# Patient Record
Sex: FEMALE | Race: WHITE | NOT HISPANIC OR LATINO
[De-identification: names, ages, dates, MRNs, and addresses within clinical notes are randomized per-mention and may not be internally consistent; named-entity substitution may affect disease eponyms.]

---

## 2017-04-27 ENCOUNTER — RECORDS - HEALTHEAST (OUTPATIENT)
Dept: ADMINISTRATIVE | Facility: OTHER | Age: 57
End: 2017-04-27

## 2018-02-13 ENCOUNTER — OFFICE VISIT - HEALTHEAST (OUTPATIENT)
Dept: FAMILY MEDICINE | Facility: CLINIC | Age: 58
End: 2018-02-13

## 2018-02-13 DIAGNOSIS — J45.901 ASTHMA EXACERBATION: ICD-10-CM

## 2018-02-13 ASSESSMENT — MIFFLIN-ST. JEOR: SCORE: 1075.25

## 2018-12-13 ENCOUNTER — AMBULATORY - HEALTHEAST (OUTPATIENT)
Dept: MULTI SPECIALTY CLINIC | Facility: CLINIC | Age: 58
End: 2018-12-13

## 2018-12-13 LAB
HPV_EXT - HISTORICAL: NORMAL
PAP SMEAR - HIM PATIENT REPORTED: NORMAL

## 2019-01-18 ENCOUNTER — RECORDS - HEALTHEAST (OUTPATIENT)
Dept: ADMINISTRATIVE | Facility: OTHER | Age: 59
End: 2019-01-18

## 2020-01-10 ENCOUNTER — OFFICE VISIT - HEALTHEAST (OUTPATIENT)
Dept: FAMILY MEDICINE | Facility: CLINIC | Age: 60
End: 2020-01-10

## 2020-01-10 DIAGNOSIS — J45.901 ASTHMA EXACERBATION: ICD-10-CM

## 2020-01-10 DIAGNOSIS — Z12.11 SCREEN FOR COLON CANCER: ICD-10-CM

## 2020-01-10 DIAGNOSIS — Z13.220 LIPID SCREENING: ICD-10-CM

## 2020-01-10 DIAGNOSIS — R73.02 IGT (IMPAIRED GLUCOSE TOLERANCE): ICD-10-CM

## 2020-01-10 LAB
CHOLEST SERPL-MCNC: 213 MG/DL
FASTING STATUS PATIENT QL REPORTED: YES
HBA1C MFR BLD: 6.1 % (ref 3.5–6)
HDLC SERPL-MCNC: 53 MG/DL
LDLC SERPL CALC-MCNC: 140 MG/DL
TRIGL SERPL-MCNC: 100 MG/DL

## 2020-01-10 RX ORDER — ALBUTEROL SULFATE 0.83 MG/ML
2.5 SOLUTION RESPIRATORY (INHALATION) 3 TIMES DAILY PRN
Qty: 60 ML | Refills: 1 | Status: SHIPPED | OUTPATIENT
Start: 2020-01-10

## 2020-01-10 ASSESSMENT — MIFFLIN-ST. JEOR: SCORE: 1040.49

## 2020-08-25 ENCOUNTER — OFFICE VISIT - HEALTHEAST (OUTPATIENT)
Dept: FAMILY MEDICINE | Facility: CLINIC | Age: 60
End: 2020-08-25

## 2020-08-25 DIAGNOSIS — R52 BODY ACHES: ICD-10-CM

## 2020-08-25 DIAGNOSIS — R53.83 FATIGUE, UNSPECIFIED TYPE: ICD-10-CM

## 2020-08-25 DIAGNOSIS — R73.02 IGT (IMPAIRED GLUCOSE TOLERANCE): ICD-10-CM

## 2020-08-25 LAB
ERYTHROCYTE [DISTWIDTH] IN BLOOD BY AUTOMATED COUNT: 12.1 % (ref 11–14.5)
ERYTHROCYTE [SEDIMENTATION RATE] IN BLOOD BY WESTERGREN METHOD: 7 MM/HR (ref 0–20)
HBA1C MFR BLD: 5.7 %
HCT VFR BLD AUTO: 39.8 % (ref 35–47)
HGB BLD-MCNC: 13 G/DL (ref 12–16)
MCH RBC QN AUTO: 27.9 PG (ref 27–34)
MCHC RBC AUTO-ENTMCNC: 32.8 G/DL (ref 32–36)
MCV RBC AUTO: 85 FL (ref 80–100)
PLATELET # BLD AUTO: 242 THOU/UL (ref 140–440)
PMV BLD AUTO: 8.1 FL (ref 7–10)
RBC # BLD AUTO: 4.67 MILL/UL (ref 3.8–5.4)
WBC: 4.7 THOU/UL (ref 4–11)

## 2020-08-26 LAB
ALBUMIN SERPL-MCNC: 4 G/DL (ref 3.5–5)
ALP SERPL-CCNC: 62 U/L (ref 45–120)
ALT SERPL W P-5'-P-CCNC: 15 U/L (ref 0–45)
ANION GAP SERPL CALCULATED.3IONS-SCNC: 10 MMOL/L (ref 5–18)
AST SERPL W P-5'-P-CCNC: 22 U/L (ref 0–40)
BILIRUB SERPL-MCNC: 0.2 MG/DL (ref 0–1)
BUN SERPL-MCNC: 16 MG/DL (ref 8–22)
C REACTIVE PROTEIN LHE: 0.2 MG/DL (ref 0–0.8)
CALCIUM SERPL-MCNC: 9.3 MG/DL (ref 8.5–10.5)
CHLORIDE BLD-SCNC: 103 MMOL/L (ref 98–107)
CO2 SERPL-SCNC: 28 MMOL/L (ref 22–31)
CREAT SERPL-MCNC: 0.83 MG/DL (ref 0.6–1.1)
GFR SERPL CREATININE-BSD FRML MDRD: >60 ML/MIN/1.73M2
GLUCOSE BLD-MCNC: 96 MG/DL (ref 70–125)
POTASSIUM BLD-SCNC: 4.3 MMOL/L (ref 3.5–5)
PROT SERPL-MCNC: 6.6 G/DL (ref 6–8)
SODIUM SERPL-SCNC: 141 MMOL/L (ref 136–145)

## 2020-08-27 LAB
ANA SER QL: 0.1 U
B BURGDOR IGG+IGM SER QL: 1.61 INDEX VALUE

## 2020-08-28 ENCOUNTER — COMMUNICATION - HEALTHEAST (OUTPATIENT)
Dept: FAMILY MEDICINE | Facility: CLINIC | Age: 60
End: 2020-08-28

## 2020-09-02 ENCOUNTER — COMMUNICATION - HEALTHEAST (OUTPATIENT)
Dept: FAMILY MEDICINE | Facility: CLINIC | Age: 60
End: 2020-09-02

## 2020-09-02 DIAGNOSIS — M25.50 POLYARTHRALGIA: ICD-10-CM

## 2020-09-03 LAB
B BURGDOR AB SER-IMP: NORMAL
LYME AB IGG BAND(S): NORMAL
LYME AB IGM BAND(S): NORMAL
LYME IGG BLOT: NEGATIVE
LYME IGM BLOT: NEGATIVE

## 2021-01-20 ENCOUNTER — OFFICE VISIT - HEALTHEAST (OUTPATIENT)
Dept: FAMILY MEDICINE | Facility: CLINIC | Age: 61
End: 2021-01-20

## 2021-01-20 DIAGNOSIS — G43.109 MIGRAINE WITH AURA AND WITHOUT STATUS MIGRAINOSUS, NOT INTRACTABLE: ICD-10-CM

## 2021-01-20 DIAGNOSIS — R07.9 CHEST PAIN, UNSPECIFIED TYPE: ICD-10-CM

## 2021-01-20 DIAGNOSIS — Z12.31 VISIT FOR SCREENING MAMMOGRAM: ICD-10-CM

## 2021-01-20 LAB
ATRIAL RATE - MUSE: 54 BPM
DIASTOLIC BLOOD PRESSURE - MUSE: NORMAL
INTERPRETATION ECG - MUSE: NORMAL
P AXIS - MUSE: 60 DEGREES
PR INTERVAL - MUSE: 172 MS
QRS DURATION - MUSE: 80 MS
QT - MUSE: 430 MS
QTC - MUSE: 407 MS
R AXIS - MUSE: -12 DEGREES
SYSTOLIC BLOOD PRESSURE - MUSE: NORMAL
T AXIS - MUSE: 63 DEGREES
VENTRICULAR RATE- MUSE: 54 BPM

## 2021-01-20 RX ORDER — MULTIPLE VITAMINS W/ MINERALS TAB 9MG-400MCG
1 TAB ORAL DAILY
Status: SHIPPED | COMMUNITY
Start: 2021-01-20

## 2021-01-20 RX ORDER — MULTIVITAMIN WITH IRON
500 TABLET ORAL DAILY
Status: SHIPPED | COMMUNITY
Start: 2021-01-20

## 2021-01-20 ASSESSMENT — MIFFLIN-ST. JEOR: SCORE: 1049.16

## 2021-03-16 ENCOUNTER — COMMUNICATION - HEALTHEAST (OUTPATIENT)
Dept: FAMILY MEDICINE | Facility: CLINIC | Age: 61
End: 2021-03-16

## 2021-03-19 ENCOUNTER — COMMUNICATION - HEALTHEAST (OUTPATIENT)
Dept: FAMILY MEDICINE | Facility: CLINIC | Age: 61
End: 2021-03-19

## 2021-03-19 DIAGNOSIS — G43.109 MIGRAINE WITH AURA AND WITHOUT STATUS MIGRAINOSUS, NOT INTRACTABLE: ICD-10-CM

## 2021-03-23 RX ORDER — BUTALBITAL, ACETAMINOPHEN AND CAFFEINE 50; 325; 40 MG/1; MG/1; MG/1
1-2 TABLET ORAL EVERY 6 HOURS PRN
Qty: 20 TABLET | Refills: 0 | Status: SHIPPED | OUTPATIENT
Start: 2021-03-23

## 2021-04-06 ENCOUNTER — COMMUNICATION - HEALTHEAST (OUTPATIENT)
Dept: FAMILY MEDICINE | Facility: CLINIC | Age: 61
End: 2021-04-06

## 2021-04-06 DIAGNOSIS — J45.901 ASTHMA EXACERBATION: ICD-10-CM

## 2021-04-07 RX ORDER — ALBUTEROL SULFATE 90 UG/1
AEROSOL, METERED RESPIRATORY (INHALATION)
Qty: 8.5 INHALER | Refills: 5 | Status: SHIPPED | OUTPATIENT
Start: 2021-04-07

## 2021-04-12 ENCOUNTER — HOSPITAL ENCOUNTER (OUTPATIENT)
Dept: MAMMOGRAPHY | Facility: CLINIC | Age: 61
Discharge: HOME | End: 2021-04-12

## 2021-04-12 DIAGNOSIS — Z12.31 VISIT FOR SCREENING MAMMOGRAM: ICD-10-CM

## 2021-04-14 ENCOUNTER — RECORDS - HEALTHEAST (OUTPATIENT)
Dept: RADIOLOGY | Facility: CLINIC | Age: 61
End: 2021-04-14

## 2021-04-22 ENCOUNTER — COMMUNICATION - HEALTHEAST (OUTPATIENT)
Dept: FAMILY MEDICINE | Facility: CLINIC | Age: 61
End: 2021-04-22

## 2021-05-25 ENCOUNTER — RECORDS - HEALTHEAST (OUTPATIENT)
Dept: ADMINISTRATIVE | Facility: CLINIC | Age: 61
End: 2021-05-25

## 2021-05-26 ENCOUNTER — RECORDS - HEALTHEAST (OUTPATIENT)
Dept: ADMINISTRATIVE | Facility: CLINIC | Age: 61
End: 2021-05-26

## 2021-05-28 ASSESSMENT — ASTHMA QUESTIONNAIRES
ACT_TOTALSCORE: 22
ACT_TOTALSCORE: 9

## 2021-05-30 ENCOUNTER — RECORDS - HEALTHEAST (OUTPATIENT)
Dept: ADMINISTRATIVE | Facility: CLINIC | Age: 61
End: 2021-05-30

## 2021-06-01 VITALS — BODY MASS INDEX: 23 KG/M2 | HEIGHT: 62 IN | WEIGHT: 125 LBS

## 2021-06-04 VITALS
DIASTOLIC BLOOD PRESSURE: 60 MMHG | SYSTOLIC BLOOD PRESSURE: 94 MMHG | OXYGEN SATURATION: 98 % | BODY MASS INDEX: 22.63 KG/M2 | WEIGHT: 123 LBS | TEMPERATURE: 98.4 F | RESPIRATION RATE: 14 BRPM | HEART RATE: 68 BPM

## 2021-06-04 VITALS
SYSTOLIC BLOOD PRESSURE: 112 MMHG | WEIGHT: 118 LBS | OXYGEN SATURATION: 96 % | HEART RATE: 60 BPM | DIASTOLIC BLOOD PRESSURE: 68 MMHG | TEMPERATURE: 98.2 F | BODY MASS INDEX: 21.71 KG/M2 | HEIGHT: 62 IN

## 2021-06-05 VITALS
SYSTOLIC BLOOD PRESSURE: 104 MMHG | HEART RATE: 59 BPM | HEIGHT: 62 IN | WEIGHT: 121 LBS | OXYGEN SATURATION: 99 % | DIASTOLIC BLOOD PRESSURE: 80 MMHG | BODY MASS INDEX: 22.26 KG/M2

## 2021-06-05 NOTE — PATIENT INSTRUCTIONS - HE
"That shingles vaccine we talked about is called \"Shingrix\". Check with your insurance to see if they cover it. If they do and you're interested in getting it, let me know and we can have you come in for just the shot without having to see me.    You should be getting a call about getting your colonoscopy done sometime in 2020.    I have gone ahead and sent in the prescription for the prednisone, cough syrup, and antibiotic azithromycin.  Try to avoid taking the prednisone in the afternoon/evening because it can affect her sleep.  No alcohol or driving after taking the cough syrup.    I usually comment on labs and imaging after they are all resulted within 2 business days. If you haven't heard your results within a week, please contact the office.     Thank you for coming in today!    If you receive a survey from [employer] about your experience today, it would be very helpful if you could fill it out to let us know what went well and what we can improve!    General Information:    Today you had your appointment with Kel Cee NP    My hours are:    Monday : Out of clinic  Tuesday : 8:00AM - 5:00 PM  Wednesday: 8:00AM - 5:00 PM  Thursday: 8:00AM - 5:00 PM  Friday: 8:00AM - 5:00 PM    I am not in the office Mondays. Therefore non-urgent calls and medical messages received on Monday will be addressed when I am back in the office on Tuesday. Urgent matters will be reviewed and addressed by one of my partners in the office as needed.    If lab work was done today as part of your evaluation you will generally be contacted via [employer], mail, or phone with the results within 1-5 days. If there is an alarming result we will contact you by phone. Lab results come back at varying times, I generally wait until all lab results are available before making comments on the results.     If you need refills please contact your pharmacy. They will send a refill request to me to review. Please allow 3-5 business days for us to " process all refill requests.     My Clinical Assistant is Karyna. Please call us at [phone] or send a medical message with any questions or concerns.

## 2021-06-05 NOTE — PROGRESS NOTES
Chief Complaint   Patient presents with     Hoarse     Symptoms started around 12/29/19 and not getting better.      Cough     Dry cough.      Wheezing     Asthma     Asthma attack last week.      Medication Refill     Would like to have 2 inhalers prescribed so that she can have one at home and one in her purse.        HPI: Patient with history of asthma, celiac, fibromyalgia, and impaired fasting glucose presents today with concerns of almost 3 weeks of a dry cough and wheezing.  Sick contacts include her son with whom she was exposed to over [place] time.  Voice is a little bit hoarse.  She does get intermittent headaches with severe coughing jags.  Patient notes on and off bilateral ear discomfort.  There is associated sinus congestion with clear discharge.  No sore throat.  She has used her albuterol which provides mild, albeit temporary relief.  The cough is keeping her up at night.  Afebrile without chills.  No body aches.  Has been using NyQuil.    Regarding the impaired fasting glucose, patient notes that it is been years since she last had an A1c.  No signs or symptoms of hyperglycemia.    ROS:Review of Systems - negative except for what's listed in the HPI    SH: The Patient's  reports that she has never smoked. She has never used smokeless tobacco. She reports current alcohol use of about 3.0 standard drinks of alcohol per week. She reports that she does not use drugs.      FH: The Patient's family history includes Alzheimer's disease in her father; COPD in her mother; Heart disease in her father and mother; Hyperlipidemia in her brother and father; Stroke in her mother.     Meds:    Current Outpatient Medications on File Prior to Visit   Medication Sig Dispense Refill     cholecalciferol, vitamin D3, 50 mcg (2,000 unit) capsule Take 2,000 Units by mouth.       traMADol (ULTRAM) 50 mg tablet Take 1 tablet (50 mg total) by mouth every 6 (six) hours as needed for pain. 12 tablet 0     No current  "facility-administered medications on file prior to visit.        O:  /68   Pulse 60   Temp 98.2  F (36.8  C) (Oral)   Ht 5' 1.81\" (1.57 m)   Wt 118 lb (53.5 kg)   SpO2 96%   BMI 21.71 kg/m      Physical Examination:   General appearance - alert, well appearing, and in no distress  Mental status - alert, oriented to person, place, and time  Eyes -PERRLA, conjunctiva pink  Ears -tympanic membranes show no erythema or rupture.  Canal normal.  Nose -clear discharge noted through both nares.  Mouth - mucous membranes moist, pharynx normal  Neck - no significant adenopathy  Lymphatics - no palpable lymphadenopathy, no hepatosplenomegaly  Chest -inspiratory and expiratory wheezes throughout.  No crackles.  Heart - normal rate and regular rhythm, S1 and S2 normal, no murmurs noted  Skin - normal coloration and turgor.      A/P:     Problem List Items Addressed This Visit        ENT/CARD/PULM/ENDO Problems    IGT (impaired glucose tolerance)    Relevant Orders    Glycosylated Hemoglobin A1c (Completed)      Other Visit Diagnoses     Asthma exacerbation    -  Primary    Relevant Medications    albuterol (PROVENTIL) 2.5 mg /3 mL (0.083 %) nebulizer solution    azithromycin (ZITHROMAX Z-JOSI) 250 MG tablet    predniSONE (DELTASONE) 20 MG tablet    codeine-guaiFENesin (CODEINE-GUAIFENESIN)  mg/5 mL liquid    albuterol (PROAIR HFA;PROVENTIL HFA;VENTOLIN HFA) 90 mcg/actuation inhaler    Screen for colon cancer        Relevant Orders    Ambulatory referral for Colonoscopy    Lipid screening        Relevant Orders    Lipid [place] FASTING (Completed)            1. Asthma exacerbation  Refill of albuterol nebs and inhaler sent to the pharmacy.  Cough syrup to use at night.  Given significant respiratory symptoms, start prednisone.  Azithromycin for possible bacterial coverage.      - albuterol (PROVENTIL) 2.5 mg /3 mL (0.083 %) nebulizer solution; Take 3 mL (2.5 mg total) by nebulization 3 (three) times a day as " needed for wheezing.  Dispense: 60 mL; Refill: 1  - azithromycin (ZITHROMAX Z-JOSI) 250 MG tablet; Take 2 tablets (500 mg) on  Day 1,  followed by 1 tablet (250 mg) once daily on Days 2 through 5.  Dispense: 6 tablet; Refill: 0  - predniSONE (DELTASONE) 20 MG tablet; Take 40 mg by mouth daily for 7 days.  Dispense: 14 tablet; Refill: 0  - codeine-guaiFENesin (CODEINE-GUAIFENESIN)  mg/5 mL liquid; Take 5 mL by mouth 3 (three) times a day as needed.  Dispense: 236 mL; Refill: 0  - albuterol (PROAIR HFA;PROVENTIL HFA;VENTOLIN HFA) 90 mcg/actuation inhaler; Inhale 2 puffs every 6 (six) hours as needed for wheezing.  Dispense: 2 each; Refill: 1    2. Screen for colon cancer  - Ambulatory referral for Colonoscopy    3. Lipid screening  - Lipid [place] FASTING    4. IGT (impaired glucose tolerance)  Check A1c to ensure no diabetes.    - Glycosylated Hemoglobin A1c        Kel Cee, CNP

## 2021-06-10 NOTE — PATIENT INSTRUCTIONS - HE
Blood work today to keep an eye out for anything inflammatory along with a reoccurrence of Lyme's.    Once all the test come back we will reach out to you to see how you are feeling and come up with a plan after that.

## 2021-06-10 NOTE — PROGRESS NOTES
Chief Complaint   Patient presents with     Joint Pain     2-3 weeks ago after working in her yard found bite marks straight up her back, first the bites were itchy then painful like a bruise. Then started having all over joint pain that has worsened her fatigue level & fibromyalgia       HPI: Patient presents today with 2 to 3 weeks of symptoms.  This all started after she found insect bites going up and down her back.  First they were painful, but that has self resolved.  She slowly started to develop diffuse joint pains, headaches, and worsening fatigue with this.  No oral lesions.  She did not see any ticks or erythema migrans.  No fever or temperatures to report.  No abnormal rashes.  She was tested and found positive for Lyme's for years ago she reports.  The stiffness in the hands was so bad that it was hard to make a fist.  Known history of fibromyalgia.    ROS:Review of Systems - negative except for what's listed in the HPI    SH: The Patient's  reports that she has never smoked. She has never used smokeless tobacco. She reports current alcohol use of about 3.0 standard drinks of alcohol per week. She reports that she does not use drugs.      FH: The Patient's family history includes Alzheimer's disease in her father; COPD in her mother; Heart disease in her father and mother; Hyperlipidemia in her brother and father; Stroke in her mother.     Meds:    Current Outpatient Medications on File Prior to Visit   Medication Sig Dispense Refill     albuterol (PROAIR HFA;PROVENTIL HFA;VENTOLIN HFA) 90 mcg/actuation inhaler Inhale 2 puffs every 6 (six) hours as needed for wheezing. 2 each 1     albuterol (PROVENTIL) 2.5 mg /3 mL (0.083 %) nebulizer solution Take 3 mL (2.5 mg total) by nebulization 3 (three) times a day as needed for wheezing. 60 mL 1     cholecalciferol, vitamin D3, 50 mcg (2,000 unit) capsule Take 2,000 Units by mouth.       traMADol (ULTRAM) 50 mg tablet Take 1 tablet (50 mg total) by mouth every  6 (six) hours as needed for pain. 12 tablet 0     codeine-guaiFENesin (CODEINE-GUAIFENESIN)  mg/5 mL liquid Take 5 mL by mouth 3 (three) times a day as needed. 236 mL 0     No current facility-administered medications on file prior to visit.        O:  BP 94/60   Pulse 68   Temp 98.4  F (36.9  C) (Oral)   Resp 14   Wt 123 lb (55.8 kg)   SpO2 98%   Breastfeeding No   BMI 22.63 kg/m      Physical Examination:   General appearance - alert, well appearing, and in no distress  Mental status - alert, oriented to person, place, and time  Mouth - mucous membranes moist, pharynx normal  Neck - no significant adenopathy  Lymphatics - no palpable lymphadenopathy, no hepatosplenomegaly  Chest - clear to auscultation, no wheezes, rales or rhonchi, symmetric air entry  Heart - normal rate and regular rhythm, S1 and S2 normal, no murmurs noted  Neurological - alert, oriented, normal speech, no focal findings or movement disorder noted, neck supple without rigidity, cranial nerves II through XII intact, motor and sensory grossly normal bilaterally, normal muscle tone, no tremors, strength 5/5  Musculoskeletal -decreased range of motion and  strength in both hands secondary to stiffness and pain.  Extremities - peripheral pulses normal, no pedal edema, no cyanosis  Skin -  along the back there are 3 areas which she says the insect bites were red.  No erythema.  No tenderness.  Small scabbing noted.    A/P:     Problem List Items Addressed This Visit        Edg Concept Cardiac Problems    IGT (impaired glucose tolerance)    Relevant Orders    Glycosylated Hemoglobin A1c (Completed)      Other Visit Diagnoses     Body aches    -  Primary    Relevant Orders    Lyme Antibody [place] (Completed)    Sedimentation Rate (Completed)    C-Reactive Protein(CRP) (Completed)    Antinuclear Antibody (LELIA) Cascade    Comprehensive Metabolic Panel (Completed)    HM2(CBC w/o Differential) (Completed)    Lyme Disease Antibody  Confirmation    Fatigue, unspecified type        Relevant Orders    Lyme Antibody [place] (Completed)    Lyme Disease Antibody Confirmation        Rule out recurrence of Lyme's.  Assess inflammatory markers.  Make sure her prediabetes has not worsened into diabetes.  For the fatigue, rule out anemia.    1. Body aches  - Lyme Antibody Cascade  - Sedimentation Rate  - C-Reactive Protein(CRP)  - Antinuclear Antibody (LELIA) Cascade  - Comprehensive Metabolic Panel  - HM2(CBC w/o Differential)  - Lyme Disease Antibody Confirmation    2. Fatigue, unspecified type  - Lyme Antibody C[place]  - Lyme Disease Antibody Confirmation    3. IGT (impaired glucose tolerance)  - Glycosylated Hemoglobin A1c    Kel Cee, CNP

## 2021-06-11 NOTE — TELEPHONE ENCOUNTER
Who is calling:  Patient   Reason for Call:  Caller is needing for medication to be sent over as soon as possible due to her being at the pharmacy.  Caller is needing a call back once its done.   Date of last appointment with primary care:   Okay to leave a detailed message: Yes

## 2021-06-14 NOTE — PATIENT INSTRUCTIONS - HE
We can try the fioricet to use as needed at the start of headaches.      If this doesn't help or the frequency doesn't improve, we can either try a preventative medicine like topiramate or reconnect you with neurology.    EKG looks good!    You can try a 1/2 teaspoon of baking soda with at least 4 oz of water for reflux.  If no improvement, we should also look into the heart with a stress test. Keep me updated if not getting better.

## 2021-06-14 NOTE — PROGRESS NOTES
Chief Complaint   Patient presents with     Heartburn     Not on meds. Flaring up daily. Something simple like oatmeal is flaring it up as well.      Migraine     Happening more often for some reason. No medication for it. Has one currently.        HPI: Patient has been struggling with reflux issues for the last 35 years.  She has tried multiple medications and unfortunately fails to find meaningful relief.  The most beneficial medicine she took was sodium bicarb.  She was not sure if the omeprazole was helpful since that this did not help in the past, but rather the sodium bicarbonate.  No shortness of breath.  When she gets the reflux she gets a burning symptom along the right chest.  It is triggered with eating and is nonexertional.  No diaphoresis or profound fatigue issues.  No personal heart history but there is some cardiac history in both her parents.  Non-smoker.  No significant nausea issues.  No lightheadedness or dizziness.  Denies hemoptysis.    Patient also continues to struggle with migraines.  This also has been a longstanding issue for which she has tried multiple medications.  Triptans unfortunately do not sit well with her.  She has also tried botox injections without good relief of symptoms.  She does get visual auras prior to them.  No neurological deficits with the headaches.  Has not tried any preventative medicine like topiramate.      ROS:Review of Systems - negative except for what's listed in the HPI    SH: The Patient's  reports that she has never smoked. She has never used smokeless tobacco. She reports current alcohol use of about 3.0 standard drinks of alcohol per week. She reports that she does not use drugs.      FH: The Patient's family history includes Alzheimer's disease in her father; COPD in her mother; Heart disease in her father and mother; Hyperlipidemia in her brother and father; Stroke in her mother.     Meds:    Current Outpatient Medications on File Prior to Visit  "  Medication Sig Dispense Refill     albuterol (PROAIR HFA;PROVENTIL HFA;VENTOLIN HFA) 90 mcg/actuation inhaler Inhale 2 puffs every 6 (six) hours as needed for wheezing. 2 each 1     cholecalciferol, vitamin D3, 50 mcg (2,000 unit) capsule Take 2,000 Units by mouth.       magnesium 250 mg Tab Take 500 mg by mouth daily.       multivitamin with minerals (THERA-M) 9 mg iron-400 mcg Tab tablet Take 1 tablet by mouth daily.       albuterol (PROVENTIL) 2.5 mg /3 mL (0.083 %) nebulizer solution Take 3 mL (2.5 mg total) by nebulization 3 (three) times a day as needed for wheezing. 60 mL 1     [DISCONTINUED] traMADol (ULTRAM) 50 mg tablet Take 1 tablet (50 mg total) by mouth every 6 (six) hours as needed for pain. 12 tablet 0     No current facility-administered medications on file prior to visit.        O:  /80   Pulse (!) 59   Ht 5' 1.5\" (1.562 m)   Wt 121 lb (54.9 kg)   SpO2 99%   BMI 22.49 kg/m      Physical Examination:   General appearance - alert, well appearing, and in no distress  Mental status - alert, oriented to person, place, and time  Eyes -PERRLA  Ears - bilateral TM's and external ear canals normal  Neck - no significant adenopathy, no carotid bruit  Lymphatics - no palpable lymphadenopathy  Chest - clear to auscultation, no wheezes, rales or rhonchi, symmetric air entry  Heart - normal rate and regular rhythm, S1 and S2 normal, no murmurs noted  Abdomen - soft, nontender, nondistended, no masses or organomegaly  Neurological - alert, oriented, normal speech, no focal findings or movement disorder noted, neck supple without rigidity, cranial nerves II through XII intact, motor and sensory grossly normal bilaterally, normal muscle tone, no tremors, strength 5/5  Extremities - peripheral pulses normal, no peripheral edema  Skin - normal coloration and turgor.      A/P:     Problem List Items Addressed This Visit        Edg Concept Cardiac Problems    Migraine headache    Relevant Medications    " butalbital-acetaminophen-caffeine (FIORICET, ESGIC) -40 mg per tablet      Other Visit Diagnoses     Visit for screening mammogram    -  Primary    Relevant Orders    Mammo Screening Bilateral    Chest pain, unspecified type        Relevant Orders    Electrocardiogram Perform and Read (Completed)        Reviewed past EKG.  Updated today.  No significant concerns.  Eating does seem to trigger it and she expresses no dysphagia.  This is likely her persistent reflux.  Did discuss the chest pain lasting >5 minutes is a high risk complaint and that needs evaluation in the emergency department.  Try over-the-counter sodium bicarbonate.  If no improvement, stress test.  If that is normal, EGD/GI.  For the migraine, trial of Fioricet.  Discussed potential side effects.  Use sparingly.  If persistent issue, try topiramate.  Keep headache diary.    1. Chest pain, unspecified type  - Electrocardiogram Perform and Read    2. Migraine with aura and without status migrainosus, not intractable  - butalbital-acetaminophen-caffeine (FIORICET, ESGIC) -40 mg per tablet; Take 1-2 tablets by mouth every 6 (six) hours as needed for headaches.  Dispense: 20 tablet; Refill: 0    3. Visit for screening mammogram  - Mammo Screening Bilateral; Future    Total time spent was at least 30 minutes including reviewing records prior to arrival, consultation, placing orders, education, and reviewing the plan of care.      Kel Cee, CNP      This note has been dictated using voice recognition software. Any grammatical or context distortions are unintentional and inherent to the software.

## 2021-06-16 NOTE — PROGRESS NOTES
"Subjective:      Patient ID: Ana Paula Mota is a 57 y.o. female.    Chief Complaint:    HPI Ana Paula Mota is a 57 y.o. female who presents today complaining of asthma flare up x 1 day. Patient states that she has been wheezing and using her albuterol inhaler frequently without much improvement.  Yesterday the patient was in a house that was not well That had a bird, dog, and cat.  These are known triggers for her.  She has not been taking any medications over-the-counter other than her albuterol inhaler almost hourly and Robitussin for cough.  She denies other sick symptoms.  She has not been on prednisone for multiple years.  She states that she has tolerated it well in the past and it was effective for her asthma flareups.        Social History   Substance Use Topics     Smoking status: Never Smoker     Smokeless tobacco: Never Used     Alcohol use None       Review of Systems   Constitutional: Negative for fever.   HENT: Negative for congestion and rhinorrhea.    Respiratory: Positive for cough, chest tightness and wheezing. Negative for shortness of breath.        Objective:     /64 (Patient Site: Right Arm, Patient Position: Sitting, Cuff Size: Adult Regular)  Pulse 83  Temp 97.9  F (36.6  C) (Oral)   Resp 20  Ht 5' 2\" (1.575 m)  Wt 125 lb (56.7 kg)  SpO2 98%  BMI 22.86 kg/m2    Physical Exam   Constitutional: She appears well-developed and well-nourished. No distress.   HENT:   Head: Normocephalic and atraumatic.   Right Ear: External ear normal.   Left Ear: External ear normal.   Eyes: Conjunctivae are normal.   Cardiovascular: Normal rate, regular rhythm and normal heart sounds.  Exam reveals no gallop and no friction rub.    No murmur heard.  Pulmonary/Chest: Effort normal. No respiratory distress. She has wheezes. She has no rales. She exhibits no tenderness.   Skin: She is not diaphoretic.   Psychiatric: She has a normal mood and affect. Her behavior is normal. Judgment and thought content " normal.   Nursing note and vitals reviewed.        Assessment:     Procedures    1. Asthma exacerbation  predniSONE (DELTASONE) 20 MG tablet    albuterol (PROAIR HFA;PROVENTIL HFA;VENTOLIN HFA) 90 mcg/actuation inhaler         Patient Instructions   1. Take Prednisone as prescribed according to bottle instructions. I recommend taking it in the morning if you can.   2.Take Zyrtec for allergy relief.   3. Use albuterol as needed like you have been.   4. Follow up with you PCP if your symptoms are not improving over the course of the next 5 days.   5. Seek emergency medical attention for significant shortness of breath, chest pain, or fainting.

## 2021-06-16 NOTE — TELEPHONE ENCOUNTER
Medication: fioricet  Last Date Filled 1/20/21  Last appointment addressing medication use: 1/20/21      Taken as prescribed from physician notes? YES    CSA in last year: NO    Random Utox in last year: NO  (if any of the above answer NO - schedule with PCP)     Opioids + benzodiazepines? NO  (if the above answer YES - schedule with PCP every 6 months)       All responses suggest: Scheduling with PCP for further intervention

## 2021-06-16 NOTE — TELEPHONE ENCOUNTER
Refill Approved    Rx renewed per Medication Renewal Policy. Medication was last renewed on 1/10/20.    Bird Cavanaugh, [facility] Triage/Med Refill 4/7/2021     Requested Prescriptions   Pending Prescriptions Disp Refills     albuterol (PROAIR HFA;PROVENTIL HFA;VENTOLIN HFA) 90 mcg/actuation inhaler [Pharmacy Med Name: ALBUTEROL HFA (PROAIR) INHALER] 8.5 Inhaler 3     Sig: TAKE 2 PUFFS BY MOUTH EVERY 6 HOURS AS NEEDED FOR WHEEZE       Albuterol/Levalbuterol Refill Protocol Passed - 4/6/2021  9:21 AM        Passed - PCP or prescribing provider visit in last year     Last office visit with prescriber/PCP: 1/20/2021 Kel Cee CNP OR same dept: 1/20/2021 Kel Cee CNP OR same specialty: 1/20/2021 Kel Cee CNP Last physical: Visit date not found       Next appt within 3 mo: Visit date not found  Next physical within 3 mo: Visit date not found  Prescriber OR PCP: Kel Cee CNP  Last diagnosis associated with med order: 1. Asthma exacerbation  - albuterol (PROAIR HFA;PROVENTIL HFA;VENTOLIN HFA) 90 mcg/actuation inhaler [Pharmacy Med Name: ALBUTEROL HFA (PROAIR) INHALER]; TAKE 2 PUFFS BY MOUTH EVERY 6 HOURS AS NEEDED FOR WHEEZE  Dispense: 8.5 Inhaler; Refill: 3    If protocol passes may refill for 6 months if within 3 months of last provider visit (or a total of 9 months). If patient requesting >1 inhaler per month refill x 6 months and have patient make appointment with provider.

## 2021-06-16 NOTE — TELEPHONE ENCOUNTER
Controlled Substance Refill Request  Medication Name:   Requested Prescriptions     Pending Prescriptions Disp Refills     butalbital-acetaminophen-caffeine (FIORICET, ESGIC) -40 mg per tablet [Pharmacy Med Name: [id number] -40] 20 tablet 0     Sig: TAKE 1-2 TABLETS BY MOUTH EVERY 6 (SIX) HOURS AS NEEDED FOR HEADACHES.     Date Last Fill: 1/20/21  Requested Pharmacy: [facility]  Submit electronically to pharmacy  Controlled Substance Agreement on file:   Encounter-Level CSA Scan Date:    There are no encounter-level csa scan date.        Last office visit:  1/20/21

## 2021-06-20 NOTE — LETTER
Letter by Kel Cee CNP at      Author: Kel Cee CNP Service: -- Author Type: --    Filed:  Encounter Date: 1/10/2020 Status: Signed       My Asthma Action Plan     Name: Ana Paula Mota   YOB: 1960  Date: 1/10/2020   My doctor: Kel Cee CNP   My clinic: [facility]        My Rescue Medicine:   Albuterol (Proair/Ventolin/Proventil HFA) 2-4 puffs EVERY 4 HOURS as needed. Use a spacer if recommended by your provider.   My Asthma Severity:   Intermittent/Exercise Induced  Know your asthma triggers: allergens and illness             GREEN ZONE   Good Control    I feel good    No cough or wheeze    Can work, sleep and play without asthma symptoms     Take your asthma control medicine every day.     1. If exercise triggers your asthma, take your rescue medication    15 minutes before exercise or sports, and    During exercise if you have asthma symptoms  2. Spacer to use with inhaler: If you have a spacer, make sure to use it with your inhaler             YELLOW ZONE Getting Worse  I have ANY of these:    I do not feel good    Cough or wheeze    Chest feels tight    Wake up at night 1. Keep taking your Green Zone medications  2. Start taking your rescue medicine:    every 20 minutes for up to 1 hour. Then every 4 hours for 24-48 hours.  3. If you stay in the Yellow Zone for more than 12-24 hours, contact your doctor.  4. If you do not return to the Green Zone in 12-24 hours or you get worse, start taking your oral steroid medicine if prescribed by your provider.           RED ZONE Medical Alert - Get Help  I have ANY of these:    I feel awful    Medicine is not helping    Breathing getting harder    Trouble walking or talking    Nose opens wide to breathe     1. Take your rescue medicine NOW  2. If your provider has prescribed an oral steroid medicine, start taking it NOW  3. Call your doctor NOW  4. If you are still in the Red Zone after 20 minutes and you  have not reached your doctor:    Take your rescue medicine again and    Call 911 or go to the emergency room right away    See your regular doctor within 2 weeks of an Emergency Room or Urgent Care visit for follow-up treatment.          Annual Reminders:  Meet with Asthma Educator,  Flu Shot in the Fall, consider Pneumonia Vaccination for patients with asthma (aged 19 and older).    Pharmacy:   [facility] [id number] [facility] - [place], [place] - [address]  [address] [facility] [address]  Phone: [phone] Fax: [phone]      Electronically signed by Kel Cee CNP   Date: 01/10/20                      Asthma Triggers  How To Control Things That Make Your Asthma Worse    Triggers are things that make your asthma worse.  Look at the list below to help you find your triggers and what you can do about them.  You can help prevent asthma flare-ups by staying away from your triggers.      Trigger                                                          What you can do   Cigarette Smoke  Tobacco smoke can make asthma worse. Do not allow smoking in your home, car or around you.  Be sure no one smokes at a steven day care or school.  If you smoke, ask your health care provider for ways to help you quit.  Ask family members to quit too.  Ask your health care provider for a referral to Quit Plan to help you quit smoking, or call 4-204-041-PLAN.     Colds, Flu, Bronchitis  These are common triggers of asthma. Wash your hands often.  Dont touch your eyes, nose or mouth.  Get a flu shot every year.     Dust Mites  These are tiny bugs that live in cloth or carpet. They are too small to see. Wash sheets and blankets in hot water every week.   Encase pillows and mattress in dust mite proof covers.  Avoid having carpet if you can. If you have carpet, vacuum weekly.   Use a dust mask and HEPA vacuum.   Pollen and Outdoor Mold  Some people are allergic to trees, grass, or weed pollen, or molds. Try to keep your  windows closed.  Limit time out doors when pollen count is high.   Ask you health care provider about taking medicine during allergy season.     Animal Dander  Some people are allergic to skin flakes, urine or saliva from pets with fur or feathers. Keep pets with fur or feathers out of your home.    If you cant keep the pet outdoors, then keep the pet out of your bedroom.  Keep the bedroom door closed.  Keep pets off cloth furniture and away from stuffed toys.     Mice, Rats, and Cockroaches  Some people are allergic to the waste from these pests.   Cover food and garbage.  Clean up spills and food crumbs.  Store grease in the refrigerator.   Keep food out of the bedroom.   Indoor Mold  This can be a trigger if your home has high moisture. Fix leaking faucets, pipes, or other sources of water.   Clean moldy surfaces.  Dehumidify basement if it is damp and smelly.   Smoke, Strong Odors, and Sprays  These can reduce air quality. Stay away from strong odors and sprays, such as perfume, powder, hair spray, paints, smoke incense, paint, cleaning products, candles and new carpet.   Exercise or Sports  Some people with asthma have this trigger. Be active!  Ask your doctor about taking medicine before sports or exercise to prevent symptoms.    Warm up for 5-10 minutes before and after sports or exercise.     Other Triggers of Asthma  Cold air:  Cover your nose and mouth with a scarf.  Sometimes laughing or crying can be a trigger.  Some medicines and food can trigger asthma.

## 2021-06-21 NOTE — LETTER
Letter by Kel Cee CNP at      Author: Kel Cee CNP Service: -- Author Type: --    Filed:  Encounter Date: 1/20/2021 Status: (Other)       My Asthma Action Plan     Name: Ana Paula Mota   YOB: 1960  Date: 1/24/2021   My doctor: Kel Cee CNP   My clinic: [facility]        My Rescue Medicine:   Albuterol (Proair/Ventolin/Proventil HFA) 2-4 puffs EVERY 4 HOURS as needed. Use a spacer if recommended by your provider.   My Asthma Severity:   Intermittent/Exercise Induced  Know your asthma triggers: other             GREEN ZONE   Good Control    I feel good    No cough or wheeze    Can work, sleep and play without asthma symptoms     Take your asthma control medicine every day.     1. If exercise triggers your asthma, take your rescue medication    15 minutes before exercise or sports, and    During exercise if you have asthma symptoms  2. Spacer to use with inhaler: If you have a spacer, make sure to use it with your inhaler             YELLOW ZONE Getting Worse  I have ANY of these:    I do not feel good    Cough or wheeze    Chest feels tight    Wake up at night 1. Keep taking your Green Zone medications  2. Start taking your rescue medicine:    every 20 minutes for up to 1 hour. Then every 4 hours for 24-48 hours.  3. If you stay in the Yellow Zone for more than 12-24 hours, contact your doctor.  4. If you do not return to the Green Zone in 12-24 hours or you get worse, start taking your oral steroid medicine if prescribed by your provider.           RED ZONE Medical Alert - Get Help  I have ANY of these:    I feel awful    Medicine is not helping    Breathing getting harder    Trouble walking or talking    Nose opens wide to breathe     1. Take your rescue medicine NOW  2. If your provider has prescribed an oral steroid medicine, start taking it NOW  3. Call your doctor NOW  4. If you are still in the Red Zone after 20 minutes and you have not reached  your doctor:    Take your rescue medicine again and    Call 911 or go to the emergency room right away    See your regular doctor within 2 weeks of an Emergency Room or Urgent Care visit for follow-up treatment.          Annual Reminders:  Meet with Asthma Educator,  Flu Shot in the Fall, consider Pneumonia Vaccination for patients with asthma (aged 19 and older).    Pharmacy:   [facility] [id number] [facility] - [place], [place] - [address]  [address]  [facility] [address]  Phone: [phone] Fax: [phone]      Electronically signed by Kel Cee CNP   Date: 01/24/21                      Asthma Triggers  How To Control Things That Make Your Asthma Worse    Triggers are things that make your asthma worse.  Look at the list below to help you find your triggers and what you can do about them.  You can help prevent asthma flare-ups by staying away from your triggers.      Trigger                                                          What you can do   Cigarette Smoke  Tobacco smoke can make asthma worse. Do not allow smoking in your home, car or around you.  Be sure no one smokes at a steven day care or school.  If you smoke, ask your health care provider for ways to help you quit.  Ask family members to quit too.  Ask your health care provider for a referral to Quit Plan to help you quit smoking, or call 6-715-961-PLAN.     Colds, Flu, Bronchitis  These are common triggers of asthma. Wash your hands often.  Dont touch your eyes, nose or mouth.  Get a flu shot every year.     Dust Mites  These are tiny bugs that live in cloth or carpet. They are too small to see. Wash sheets and blankets in hot water every week.   Encase pillows and mattress in dust mite proof covers.  Avoid having carpet if you can. If you have carpet, vacuum weekly.   Use a dust mask and HEPA vacuum.   Pollen and Outdoor Mold  Some people are allergic to trees, grass, or weed pollen, or molds. Try to keep your windows closed.  Limit  time out doors when pollen count is high.   Ask you health care provider about taking medicine during allergy season.     Animal Dander  Some people are allergic to skin flakes, urine or saliva from pets with fur or feathers. Keep pets with fur or feathers out of your home.    If you cant keep the pet outdoors, then keep the pet out of your bedroom.  Keep the bedroom door closed.  Keep pets off cloth furniture and away from stuffed toys.     Mice, Rats, and Cockroaches  Some people are allergic to the waste from these pests.   Cover food and garbage.  Clean up spills and food crumbs.  Store grease in the refrigerator.   Keep food out of the bedroom.   Indoor Mold  This can be a trigger if your home has high moisture. Fix leaking faucets, pipes, or other sources of water.   Clean moldy surfaces.  Dehumidify basement if it is damp and smelly.   Smoke, Strong Odors, and Sprays  These can reduce air quality. Stay away from strong odors and sprays, such as perfume, powder, hair spray, paints, smoke incense, paint, cleaning products, candles and new carpet.   Exercise or Sports  Some people with asthma have this trigger. Be active!  Ask your doctor about taking medicine before sports or exercise to prevent symptoms.    Warm up for 5-10 minutes before and after sports or exercise.     Other Triggers of Asthma  Cold air:  Cover your nose and mouth with a scarf.  Sometimes laughing or crying can be a trigger.  Some medicines and food can trigger asthma.

## 2021-07-03 NOTE — ADDENDUM NOTE
Addendum Note by Shaggy Cee CNP at 1/10/2020  8:20 AM     Author: Shaggy Cee CNP Service: -- Author Type: Nurse Practitioner    Filed: 1/10/2020  9:35 AM Encounter Date: 1/10/2020 Status: Signed    : Shaggy Cee CNP (Nurse Practitioner)    Addended by: SHAGGY CEE on: 1/10/2020 09:35 AM        Modules accepted: Orders

## 2021-07-10 ENCOUNTER — HEALTH MAINTENANCE LETTER (OUTPATIENT)
Age: 61
End: 2021-07-10

## 2021-09-04 ENCOUNTER — HEALTH MAINTENANCE LETTER (OUTPATIENT)
Age: 61
End: 2021-09-04

## 2022-08-06 ENCOUNTER — HEALTH MAINTENANCE LETTER (OUTPATIENT)
Age: 62
End: 2022-08-06

## 2022-10-16 ENCOUNTER — HEALTH MAINTENANCE LETTER (OUTPATIENT)
Age: 62
End: 2022-10-16

## 2023-06-17 ENCOUNTER — HEALTH MAINTENANCE LETTER (OUTPATIENT)
Age: 63
End: 2023-06-17